# Patient Record
Sex: FEMALE | Race: WHITE | NOT HISPANIC OR LATINO
[De-identification: names, ages, dates, MRNs, and addresses within clinical notes are randomized per-mention and may not be internally consistent; named-entity substitution may affect disease eponyms.]

---

## 2021-11-03 PROBLEM — Z00.00 ENCOUNTER FOR PREVENTIVE HEALTH EXAMINATION: Status: ACTIVE | Noted: 2021-11-03

## 2021-11-05 ENCOUNTER — NON-APPOINTMENT (OUTPATIENT)
Age: 56
End: 2021-11-05

## 2021-11-05 ENCOUNTER — APPOINTMENT (OUTPATIENT)
Dept: OBGYN | Facility: CLINIC | Age: 56
End: 2021-11-05
Payer: COMMERCIAL

## 2021-11-05 VITALS
WEIGHT: 198 LBS | TEMPERATURE: 97 F | HEART RATE: 76 BPM | HEIGHT: 67 IN | DIASTOLIC BLOOD PRESSURE: 70 MMHG | SYSTOLIC BLOOD PRESSURE: 118 MMHG | BODY MASS INDEX: 31.08 KG/M2

## 2021-11-05 DIAGNOSIS — Z80.8 FAMILY HISTORY OF MALIGNANT NEOPLASM OF OTHER ORGANS OR SYSTEMS: ICD-10-CM

## 2021-11-05 DIAGNOSIS — Z12.4 ENCOUNTER FOR SCREENING FOR MALIGNANT NEOPLASM OF CERVIX: ICD-10-CM

## 2021-11-05 DIAGNOSIS — Z87.891 PERSONAL HISTORY OF NICOTINE DEPENDENCE: ICD-10-CM

## 2021-11-05 DIAGNOSIS — Z82.49 FAMILY HISTORY OF ISCHEMIC HEART DISEASE AND OTHER DISEASES OF THE CIRCULATORY SYSTEM: ICD-10-CM

## 2021-11-05 DIAGNOSIS — Z01.411 ENCOUNTER FOR GYNECOLOGICAL EXAMINATION (GENERAL) (ROUTINE) WITH ABNORMAL FINDINGS: ICD-10-CM

## 2021-11-05 DIAGNOSIS — N95.1 MENOPAUSAL AND FEMALE CLIMACTERIC STATES: ICD-10-CM

## 2021-11-05 PROCEDURE — 99396 PREV VISIT EST AGE 40-64: CPT

## 2021-11-05 RX ORDER — FLUOXETINE HYDROCHLORIDE 10 MG/1
10 CAPSULE ORAL
Qty: 60 | Refills: 2 | Status: ACTIVE | COMMUNITY
Start: 2021-11-05 | End: 1900-01-01

## 2021-11-05 NOTE — PHYSICAL EXAM
[Chaperone Present] : A chaperone was present in the examining room during all aspects of the physical examination [Appropriately responsive] : appropriately responsive [Alert] : alert [No Acute Distress] : no acute distress [Oriented x3] : oriented x3 [Examination Of The Breasts] : a normal appearance [No Masses] : no breast masses were palpable [Labia Majora] : normal [Labia Minora] : normal [Normal] : normal [Uterine Adnexae] : normal [FreeTextEntry5] : nonlabored

## 2021-11-05 NOTE — HISTORY OF PRESENT ILLNESS
[FreeTextEntry1] : 55-year-old G2, P2 here for annual GYN visit.  She has been menopausal for over 3 years, denies interval bleeding or significant climacteric symptoms.  She was having some emotional lability associated with menopause which she was taking fluoxetine.  She may have a prescription and would like to restart the medication at this point.

## 2021-11-05 NOTE — PLAN
[FreeTextEntry1] : Pap smear HPV cotesting done today, mammogram breast ultrasound ordered for annual breast cancer screening testing.  All other age-appropriate testing done by primary care provider.\par Will restart fluoxetine for menopausal symptoms and emotional lability.

## 2021-11-11 LAB — HPV HIGH+LOW RISK DNA PNL CVX: NOT DETECTED

## 2021-11-22 LAB — CYTOLOGY CVX/VAG DOC THIN PREP: NORMAL

## 2023-08-21 ENCOUNTER — NON-APPOINTMENT (OUTPATIENT)
Age: 58
End: 2023-08-21

## 2023-08-23 ENCOUNTER — APPOINTMENT (OUTPATIENT)
Dept: NEUROLOGY | Facility: CLINIC | Age: 58
End: 2023-08-23
Payer: COMMERCIAL

## 2023-08-23 VITALS — BODY MASS INDEX: 31.08 KG/M2 | WEIGHT: 198 LBS | HEIGHT: 67 IN

## 2023-08-23 DIAGNOSIS — R42 DIZZINESS AND GIDDINESS: ICD-10-CM

## 2023-08-23 PROCEDURE — 99203 OFFICE O/P NEW LOW 30 MIN: CPT

## 2023-08-23 NOTE — PHYSICAL EXAM

## 2023-08-23 NOTE — HISTORY OF PRESENT ILLNESS
[FreeTextEntry1] : It's a pleasure to see Ms. JESSICA KIM In the office today. She is a 57 year -  old woman  who presents to the office today for the evaluation of chronic vertigo for over 30 years.  She has seen multiple specialist in the past, and one of the doctor gave her the diagnosis of Ménière's disease. She does report some ear fullness but denies any change in hearing and denies any tinnitus.  In the beginning she had done vestibular therapy with Epley maneuver, even though initially it caused more vertigo, but her overall symptom did not improve with the maneuver.  However she has been hesitant to go for additional round of vestibular therapy.  She has been taking meclizine on as-needed basis whenever she gets a flareup.  Her last episode before this current one was over 3 years ago.  Most of her episodes were shorter left but this recent 1 lasted a few weeks.  The main reason she is here is because her previous ENG/VAT test report suggests possible CNS involvement

## 2023-08-23 NOTE — ASSESSMENT
[FreeTextEntry1] : Vertigo.  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  I recommend patient trying it but she is very hesitant.  Since she never had a full neurological work-up, and the ENG/VAT test showed evidence of CNS involvement, I recommend getting a MRI of the brain without gadolinium to rule out intracranial pathology.    I, Ellen Connell, Attest that this documentation has been prepared under the direction and in the presence of Provider Tyson Meyer DO  Thank You for letting me assist in the management of this patient.   Tyson Meyer DO Board Certified, Neurology

## 2023-09-11 ENCOUNTER — APPOINTMENT (OUTPATIENT)
Dept: NEUROLOGY | Facility: CLINIC | Age: 58
End: 2023-09-11

## 2023-11-09 ENCOUNTER — APPOINTMENT (OUTPATIENT)
Dept: OBGYN | Facility: CLINIC | Age: 58
End: 2023-11-09

## 2024-08-30 DIAGNOSIS — Z12.39 ENCOUNTER FOR OTHER SCREENING FOR MALIGNANT NEOPLASM OF BREAST: ICD-10-CM

## 2024-09-05 ENCOUNTER — APPOINTMENT (OUTPATIENT)
Dept: OBGYN | Facility: CLINIC | Age: 59
End: 2024-09-05
Payer: COMMERCIAL

## 2024-09-05 VITALS
BODY MASS INDEX: 25.74 KG/M2 | WEIGHT: 164 LBS | HEIGHT: 67 IN | HEART RATE: 80 BPM | DIASTOLIC BLOOD PRESSURE: 81 MMHG | SYSTOLIC BLOOD PRESSURE: 122 MMHG

## 2024-09-05 DIAGNOSIS — Z01.419 ENCOUNTER FOR GYNECOLOGICAL EXAMINATION (GENERAL) (ROUTINE) W/OUT ABNORMAL FINDINGS: ICD-10-CM

## 2024-09-05 PROCEDURE — 99386 PREV VISIT NEW AGE 40-64: CPT

## 2024-09-05 PROCEDURE — 99459 PELVIC EXAMINATION: CPT

## 2024-09-05 NOTE — PHYSICAL EXAM
[Chaperone Present] : A chaperone was present in the examining room during all aspects of the physical examination [52290] : A chaperone was present during the pelvic exam. [Appropriately responsive] : appropriately responsive [Alert] : alert [No Acute Distress] : no acute distress [Soft] : soft [Non-tender] : non-tender [Non-distended] : non-distended [Oriented x3] : oriented x3 [FreeTextEntry2] : JCARLOS Bui [Examination Of The Breasts] : a normal appearance [No Discharge] : no discharge [No Masses] : no breast masses were palpable [Labia Majora] : normal [Labia Minora] : normal [Normal] : normal [Uterine Adnexae] : normal

## 2024-09-05 NOTE — PHYSICAL EXAM
[Chaperone Present] : A chaperone was present in the examining room during all aspects of the physical examination [64214] : A chaperone was present during the pelvic exam. [Appropriately responsive] : appropriately responsive [Alert] : alert [No Acute Distress] : no acute distress [Soft] : soft [Non-tender] : non-tender [Non-distended] : non-distended [Oriented x3] : oriented x3 [FreeTextEntry2] : JCARLOS Bui [Examination Of The Breasts] : a normal appearance [No Discharge] : no discharge [No Masses] : no breast masses were palpable [Labia Majora] : normal [Labia Minora] : normal [Normal] : normal [Uterine Adnexae] : normal

## 2024-09-05 NOTE — HISTORY OF PRESENT ILLNESS
[LMP unknown] : LMP unknown [postmenopausal] : postmenopausal [N] : Patient does not use contraception [Y] : Positive pregnancy history [unknown] : Patient is unsure of the date of her LMP [Currently In Menopause] : currently in menopause [Post-Menopause, No Sxs] : post-menopausal, currently without symptoms [Menopause Age: ____] : age at menopause was [unfilled] [No] : Patient does not have concerns regarding sex [FreeTextEntry1] : Nela is a 59 y/o here for her annual GYN exam. She reports feeling well and has no complaints at this time. She denies any menopausal symptoms or post-menopausal bleeding.   LAST AUBRIE NOTE:     	Print Patient Name	NELA KIM (55yo, F) ID# [id number]	Appt. Date/Time	2020 03:15PM 	1965	Service Dept.	[facility] SI OFFICE Provider	MADISON PAIGE MD Insurance	 Med Primary: GHI (PPO) Insurance # : [id number] Policy/Group # : [id number] Prescription: [facility] - Member is eligible. details Prescription: [facility] - Member is eligible. details Prescription: [facility] - Member is eligible. details Prescription: [facility] - Member is eligible. details Chief Complaint Well Woman Visit  CRAMPS/BREAST SORENESS Patient's Care Team Primary Care Provider: GUEVARA REY DO: [address] [facility] [address] [address], Ph [phone], Fax [phone] NPI: [id number] Patient's Pharmacies [facility]AC[facility] #[id number] (ERX): [address] [address], Ph [phone], Fax [phone] [facility] (MAIL-ORDER, ERX): [address] [facility] [address], Ph [phone], Fax [phone] [facility] 2020 03:30 pm Ht:	5 ft 7 in Wt:	180 lbs BMI:	28.2 BP:	122/78 sitting Allergies Reviewed Allergies CLINDAMYCIN	 Medications Reviewed Medications amoxicillin 500 mg capsule 16   filled	[facility] amoxicillin 500 mg tablet 20   filled	[facility] amoxicillin 500 mg-potassium clavulanate 125 mg tablet 18   filled	[facility] amoxicillin 875 mg tablet 10/05/17   filled	[facility] amoxicillin 875 mg-potassium clavulanate 125 mg tablet 16   filled	[facility] azithromycin 250 mg tablet 14   filled	[facility] benzonatate 200 mg capsule 18   filled	[facility] cefdinir 300 mg capsule 18   filled	[facility] chlorhexidine gluconate 0.12 % mouthwash 18   filled	[facility] ciprofloxacin 500 mg tablet 13   filled	[facility] clotrimazole-betamethasone 1 %-0.05 % topical cream Apply 2 g twice a day by topical route as directed for 14 days. 20   prescribed	Madison Paige MD ergocalciferol (vitamin D2) 1,250 mcg (50,000 unit) capsule 12   filled	[facility] FLUoxetine 20 mg capsule 1 daily by mouth 18   filled	[facility] HYDROcodone 7.5 mg-acetaminophen 300 mg tablet 16   filled	[facility] ibuprofen 600 mg tablet 16   filled	[facility] Iophen C-NR 10 mg-100 mg/5 mL oral liquid 02/07/15   filled	[facility] ketorolac 10 mg tablet 18   filled	[facility] meclizine 25 mg tablet 12/19/15   filled	[facility] meloxicam 15 mg tablet 17   filled	[facility] methylPREDNISolone 4 mg tablets in a dose pack 18   filled	[facility] metroNIDAZOLE 250 mg tablet 12   filled	[facility] mometasone 50 mcg/actuation nasal spray 17   filled	[facility] Pataday 0.2 % eye drops 13   filled	[facility] phenazopyridine 100 mg tablet 14   filled	[facility] phenazopyridine 200 mg tablet 13   filled	[facility] phendimetrazine tartrate  mg capsule,extended release 13   filled	[facility] phentermine 37.5 mg tablet 08/28/15   filled	[facility] predniSONE 20 mg tablet 11/02/15   filled	[facility] silver sulfadiazine 1 % topical cream 07/20/15   filled	[facility] urea 40 % topical cream 12   filled	[facility] Vaccines Reviewed Vaccines educated on Flu vaccine Problems Reviewed Problems Uterine leiomyoma Dysmenorrhea Premenstrual dysphoric disorder - Onset: 2017 Female genital organ symptoms Menorrhagia Irregular periods Family History Reviewed Family History Mother	- Hypertensive disorder Father	- Malignant neoplasm of skin - ASBESTOSIS Social History Reviewed Social History Tobacco Smoking Status: Former smoker (Notes: 17 yrs ago) Most Recent Tobacco Use Screenin2020  WITH PROSTATE CA Surgical History Reviewed Surgical History Myomectomy Other - knee GYN History Reviewed GYN History Duration of Flow (days): 7 (Notes: CRAMPY). LMP: Definite (Notes: SKIPPING MTHS). Frequency of Cycle (Q days): (Notes: IRREG). Menses Monthly: N. Flow: Heavy. Date of LMP: 2017. Obstetric History Reviewed Obstetric History TOTAL	FULL	PRE	AB. I	AB. S	ECTOPICS	MULTIPLE	LIVING 2	2						2  Past Medical History Reviewed Past Medical History Notes: vertigo Screening None recorded. HPI BUMPS IN RIGHT GROIN  ROS Patient reports hot flashes, night sweats (MILD), and dry vaginal mucosa. She reports no fatigue, no fever, no significant weight gain, and no significant weight loss. She reports no abnormal moles and no rashes. She reports no irritation and no vision changes. She reports no hearing loss, no ear pain, no nose/sinus problems, no sore throat, no snoring, no dry mouth, and no mouth ulcers. She reports no dyspnea / shortness of breath, no cough, no sputum production, no hemoptysis, and no wheezing. She reports no chest pain, no palpitations, and no orthopnea. She reports no heartburn, no dysphagia, no nausea, no vomiting, no abdominal pain, no bowel movement changes, no diarrhea, no constipation, and no rectal bleeding. She reports no hematuria, no abnormal bleeding, no flank pain, no trouble urinating, no incontinence, no rash, no lesion, no discharge, no vaginal odor, and no vaginal itching. She reports no menstrual problems and no PMDD symptoms. She reports no sexual problems. She reports no muscle aches, no muscle weakness, no arthralgias/joint pain, and no back pain. She reports no headaches, no dizziness, no LOC, no weakness, no numbness, and no seizures. She reports no depression, no alcoholism, and no sleep disturbances. Physical Exam Patient is a 54-year-old female.  Female Genitalia: Vulva: no masses or lesions and vulvar atrophy. Bladder/Urethra: no urethral discharge or mass and normal meatus and bladder non distended. Vagina no erythema, cystocele, rectocele, abnormal vaginal discharge, or vesicle(s) or ulcers and tenderness. Cervix: no discharge or cervical motion tenderness and grossly normal; atrophic...expect no ECC. Uterus: normal size and shape and midline, mobile, non-tender, and no uterine prolapse. Adnexa/Parametria: no parametrial tenderness or mass and no adnexal tenderness or ovarian mass.  Breast: Inspection/Palpation: no tenderness, skin changes, abnormal secretions, or distinct masses and normal nipple appearance and non tender axillary lymph nodes.  Abdomen: Auscultation/Inspection/Palpation: no tenderness, hepatomegaly, splenomegaly, masses, or CVA tenderness and soft, non-distended, and normal bowel sounds. Hernia: none palpated.  Constitutional: General Appearance: healthy-appearing, well-nourished, and well-developed.  Psychiatric: Orientation: to time, place, and person. Mood and Affect: normal mood and affect and active and alert.  Skin: Appearance: no rashes or lesions.  Neck: Neck: supple, FROM, trachea midline, and no masses. Thyroid: no enlargement or nodules and non-tender.  Lungs: Respiratory Effort: no intercostal retractions or accessory muscle usage. Auscultation: no wheezing, rales/crackles, or rhonchi and clear to auscultation.  Cardiovascular: Auscultation: RRR and no murmur. Peripheral Vascular: no varicosities, LLE edema, RLE edema, calf tenderness, or palpable cords and pedal pulses intact.  Lymph Nodes: Palpation: non tender submandibular nodes or inguinal nodes.  Rectal Exam: Rectum: normal perianal skin and sphincter tone and no hemorrhoids or masses.  RIGHT CRURAL  Assessment / Plan 1. Gynecologic examination Z01.419: Encounter for gynecological examination (general) (routine) without abnormal findings PAP, LB MAMMO, SCREENING, BILATERAL  2. Vulvitis - CONSIDER HRT IF NO SUCCESS, PILLS AND CREAM IF MAMMO NL  EXTENDING TO RIGHT CRURAL FOLD  N76.2: Acute vulvitis clotrimazole-betamethasone 1 %-0.05 % topical cream - Apply 2 g twice a day by topical route as directed for 14 days.     Qty: 56 g     Refills: 3     Pharmacy: [facility]/PHARMACY #[id number]  3. Abnormal uterine bleeding - CERVIX FRIABLE  N93.9: Abnormal uterine and vaginal bleeding, unspecified US, TRANSVAGINAL  US, TRANSVAGINAL  Review of us, transvaginal taken on 2020 at IN-OFFICE ORDER shows: Imaging Studies: Indications: abnormal bleeding. Uterus: volume (cc): 48. Endometrium: thickness 2.6 mm. Cervix: normal. Cul de sac: no fluid was demonstrated. Right Ovary: volume (cc): 1.3. Left Ovary: volume (cc): 1.3.  Return to Office None recorded. Encounter Sign-Off Encounter signed-off by Madison Paige MD, 2020. Encounter performed and documented by Madison Paige MD Encounter reviewed & signed by Madison Paige MD on 2020 at 4:06pm Audit history   Image Documentation #[id number] [PGHxTotal] : 2 [[facility]] : 2

## 2024-09-14 LAB
CYTOLOGY CVX/VAG DOC THIN PREP: ABNORMAL
HPV HIGH+LOW RISK DNA PNL CVX: NOT DETECTED

## 2025-08-29 ENCOUNTER — NON-APPOINTMENT (OUTPATIENT)
Age: 60
End: 2025-08-29

## 2025-09-12 ENCOUNTER — APPOINTMENT (OUTPATIENT)
Dept: OBGYN | Facility: CLINIC | Age: 60
End: 2025-09-12